# Patient Record
Sex: MALE | Race: OTHER
[De-identification: names, ages, dates, MRNs, and addresses within clinical notes are randomized per-mention and may not be internally consistent; named-entity substitution may affect disease eponyms.]

---

## 2020-01-06 ENCOUNTER — HOSPITAL ENCOUNTER (INPATIENT)
Dept: HOSPITAL 54 - DS | Age: 55
LOS: 2 days | Discharge: HOME HEALTH | DRG: 470 | End: 2020-01-08
Attending: INTERNAL MEDICINE | Admitting: INTERNAL MEDICINE
Payer: COMMERCIAL

## 2020-01-06 VITALS — DIASTOLIC BLOOD PRESSURE: 69 MMHG | SYSTOLIC BLOOD PRESSURE: 113 MMHG

## 2020-01-06 VITALS — BODY MASS INDEX: 36.65 KG/M2 | HEIGHT: 65 IN | WEIGHT: 220 LBS
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
## 2020-01-06 DIAGNOSIS — E66.01: ICD-10-CM

## 2020-01-06 DIAGNOSIS — M17.11: Primary | ICD-10-CM
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
## 2020-01-06 DIAGNOSIS — S83.241A: ICD-10-CM
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
## 2020-01-06 PROCEDURE — A6403 STERILE GAUZE>16 <= 48 SQ IN: HCPCS

## 2020-01-06 PROCEDURE — G0378 HOSPITAL OBSERVATION PER HR: HCPCS

## 2020-01-06 PROCEDURE — A6253 ABSORPT DRG > 48 SQ IN W/O B: HCPCS

## 2020-01-06 PROCEDURE — 0SRC0J9 REPLACEMENT OF RIGHT KNEE JOINT WITH SYNTHETIC SUBSTITUTE, CEMENTED, OPEN APPROACH: ICD-10-PCS | Performed by: SPECIALIST

## 2020-01-06 RX ADMIN — HYDROCODONE BITARTRATE AND ACETAMINOPHEN PRN EA: 10; 325 TABLET ORAL at 17:57

## 2020-01-06 RX ADMIN — HYDROCODONE BITARTRATE AND ACETAMINOPHEN PRN EA: 10; 325 TABLET ORAL at 21:19

## 2020-01-06 RX ADMIN — HYDROMORPHONE HYDROCHLORIDE PRN MG: 1 INJECTION, SOLUTION INTRAMUSCULAR; INTRAVENOUS; SUBCUTANEOUS at 16:24

## 2020-01-06 RX ADMIN — FAMOTIDINE SCH MG: 20 TABLET, FILM COATED ORAL at 21:18

## 2020-01-06 RX ADMIN — HYDROMORPHONE HYDROCHLORIDE PRN MG: 1 INJECTION, SOLUTION INTRAMUSCULAR; INTRAVENOUS; SUBCUTANEOUS at 13:22

## 2020-01-06 RX ADMIN — DEXTROSE MONOHYDRATE SCH MLS/HR: 50 INJECTION, SOLUTION INTRAVENOUS at 23:14

## 2020-01-06 RX ADMIN — DEXTROSE MONOHYDRATE SCH MLS/HR: 50 INJECTION, SOLUTION INTRAVENOUS at 15:36

## 2020-01-06 NOTE — NUR
MS RN OPENING NOTES



Received patient A/O x4, awake on bed. On RA, no SOB/respiratory distress noted at this 
time. S/P R knee arthroplasty day 0, with controllable pain level at this time managed with 
ice pack. Dressing clean, dry and intact, no bleeding noted. On CPM, tolerated well. Kept on 
bed clean, dry and comfortable. Call light within easy reach. On fall and aspiration 
precautions. Call light within easy reach. Will continue to monitor accordingly.

## 2020-01-06 NOTE — NUR
RN CLOSING NOTES



Patient remains on 2L nasal cannula, no sob noted.  Patient states that pain is very well 
controlled at this time.  Patient remains a/o x4 at this time, uses the urinal and has a R 
fa 20 with D5 1/2 NS.  MD to change dressing tomorrow per order.  Bed at the lowest setting, 
call light within reach, side rails up x2.  Will give report to NOC RN for RIYA bedside.

## 2020-01-07 VITALS — DIASTOLIC BLOOD PRESSURE: 77 MMHG | SYSTOLIC BLOOD PRESSURE: 130 MMHG

## 2020-01-07 VITALS — SYSTOLIC BLOOD PRESSURE: 131 MMHG | DIASTOLIC BLOOD PRESSURE: 67 MMHG

## 2020-01-07 VITALS — SYSTOLIC BLOOD PRESSURE: 130 MMHG | DIASTOLIC BLOOD PRESSURE: 77 MMHG

## 2020-01-07 VITALS — SYSTOLIC BLOOD PRESSURE: 127 MMHG | DIASTOLIC BLOOD PRESSURE: 84 MMHG

## 2020-01-07 RX ADMIN — Medication SCH MG: at 09:29

## 2020-01-07 RX ADMIN — HYDROCODONE BITARTRATE AND ACETAMINOPHEN PRN EA: 10; 325 TABLET ORAL at 10:19

## 2020-01-07 RX ADMIN — FAMOTIDINE SCH MG: 20 TABLET, FILM COATED ORAL at 20:40

## 2020-01-07 RX ADMIN — HYDROCODONE BITARTRATE AND ACETAMINOPHEN PRN EA: 10; 325 TABLET ORAL at 04:12

## 2020-01-07 RX ADMIN — HYDROMORPHONE HYDROCHLORIDE PRN MG: 1 INJECTION, SOLUTION INTRAMUSCULAR; INTRAVENOUS; SUBCUTANEOUS at 08:19

## 2020-01-07 RX ADMIN — HYDROMORPHONE HYDROCHLORIDE PRN MG: 1 INJECTION, SOLUTION INTRAMUSCULAR; INTRAVENOUS; SUBCUTANEOUS at 15:53

## 2020-01-07 RX ADMIN — ASPIRIN SCH MG: 325 TABLET, FILM COATED ORAL at 09:29

## 2020-01-07 RX ADMIN — ASPIRIN SCH MG: 325 TABLET, FILM COATED ORAL at 18:04

## 2020-01-07 RX ADMIN — Medication SCH MG: at 18:04

## 2020-01-07 RX ADMIN — HYDROCODONE BITARTRATE AND ACETAMINOPHEN PRN EA: 10; 325 TABLET ORAL at 20:39

## 2020-01-07 RX ADMIN — FAMOTIDINE SCH MG: 20 TABLET, FILM COATED ORAL at 09:29

## 2020-01-07 NOTE — NUR
Receiving notes: alert and orientated x3 speech clear, right leg dressing CDI, toes warm and 
blanching smiling and verbalizing his needs call light within reach

## 2020-01-08 VITALS — DIASTOLIC BLOOD PRESSURE: 74 MMHG | SYSTOLIC BLOOD PRESSURE: 122 MMHG

## 2020-01-08 LAB
BASOPHILS # BLD AUTO: 0 /CMM (ref 0–0.2)
BASOPHILS NFR BLD AUTO: 0.3 % (ref 0–2)
BUN SERPL-MCNC: 7 MG/DL (ref 7–18)
CALCIUM SERPL-MCNC: 8 MG/DL (ref 8.5–10.1)
CHLORIDE SERPL-SCNC: 102 MMOL/L (ref 98–107)
CO2 SERPL-SCNC: 27 MMOL/L (ref 21–32)
CREAT SERPL-MCNC: 0.7 MG/DL (ref 0.6–1.3)
EOSINOPHIL NFR BLD AUTO: 2.5 % (ref 0–6)
GLUCOSE SERPL-MCNC: 99 MG/DL (ref 74–106)
HCT VFR BLD AUTO: 40 % (ref 39–51)
HGB BLD-MCNC: 13.9 G/DL (ref 13.5–17.5)
LYMPHOCYTES NFR BLD AUTO: 1.3 /CMM (ref 0.8–4.8)
LYMPHOCYTES NFR BLD AUTO: 17.7 % (ref 20–44)
MAGNESIUM SERPL-MCNC: 1.8 MG/DL (ref 1.8–2.4)
MCHC RBC AUTO-ENTMCNC: 35 G/DL (ref 31–36)
MCV RBC AUTO: 94 FL (ref 80–96)
MONOCYTES NFR BLD AUTO: 0.8 /CMM (ref 0.1–1.3)
MONOCYTES NFR BLD AUTO: 10.4 % (ref 2–12)
NEUTROPHILS # BLD AUTO: 5.1 /CMM (ref 1.8–8.9)
NEUTROPHILS NFR BLD AUTO: 69.1 % (ref 43–81)
PHOSPHATE SERPL-MCNC: 2.3 MG/DL (ref 2.5–4.9)
PLATELET # BLD AUTO: 200 /CMM (ref 150–450)
POTASSIUM SERPL-SCNC: 3.4 MMOL/L (ref 3.5–5.1)
RBC # BLD AUTO: 4.26 MIL/UL (ref 4.5–6)
SODIUM SERPL-SCNC: 139 MMOL/L (ref 136–145)
WBC NRBC COR # BLD AUTO: 7.4 K/UL (ref 4.3–11)

## 2020-01-08 RX ADMIN — HYDROCODONE BITARTRATE AND ACETAMINOPHEN PRN EA: 10; 325 TABLET ORAL at 14:34

## 2020-01-08 RX ADMIN — HYDROCODONE BITARTRATE AND ACETAMINOPHEN PRN EA: 10; 325 TABLET ORAL at 05:35

## 2020-01-08 RX ADMIN — Medication SCH MG: at 10:10

## 2020-01-08 RX ADMIN — ASPIRIN SCH MG: 325 TABLET, FILM COATED ORAL at 10:10

## 2020-01-08 RX ADMIN — FAMOTIDINE SCH MG: 20 TABLET, FILM COATED ORAL at 10:10

## 2020-01-08 RX ADMIN — HYDROCODONE BITARTRATE AND ACETAMINOPHEN PRN EA: 10; 325 TABLET ORAL at 10:21

## 2020-01-08 NOTE — NUR
ENDING NOTES: medicated with NORCO (per pt request) for right legs pain...this was effective 
for relief.. Offered IV anagesic. right leg with BIAS wrap CDI  toes with blanching. 
INCENTICE SPIR encouraged ...up to 3000ml .  Ambien requiested and effective, slept soundly 
thru the night.  Ice Applied to the right knee.

## 2020-01-08 NOTE — NUR
NEUTRA -PHOS AND POTASSIUM REPLACEMENTS GIVEN.PT. INSTRUCTED IN SAME.SEEN BY PTX.ACE CHANGED 
AS WELL AS RT. KNEE DRESSING.CALLED [facility] TO GET DC PAIN PRESCRIPTION.ALL INSTRUCTIONS 
GIVEN TO WIFE AND SON.BELONGING SHEET SIGNED.HEP LOCK REMOVED.TAKEN TO LOBBY VIA W/C 
ACCOMPANIED BY CNA AND FAMILY.INSTRUCTED ON S/S TO REPORT.PT.FAMILY VERBALIZED 
UNDERSTANDING.